# Patient Record
Sex: FEMALE | Race: WHITE
[De-identification: names, ages, dates, MRNs, and addresses within clinical notes are randomized per-mention and may not be internally consistent; named-entity substitution may affect disease eponyms.]

---

## 2017-09-14 PROBLEM — Z00.00 ENCOUNTER FOR PREVENTIVE HEALTH EXAMINATION: Status: ACTIVE | Noted: 2017-09-14

## 2017-09-19 ENCOUNTER — APPOINTMENT (OUTPATIENT)
Dept: BREAST CENTER | Facility: CLINIC | Age: 62
End: 2017-09-19
Payer: COMMERCIAL

## 2017-09-19 VITALS
WEIGHT: 135 LBS | SYSTOLIC BLOOD PRESSURE: 110 MMHG | DIASTOLIC BLOOD PRESSURE: 60 MMHG | BODY MASS INDEX: 21.69 KG/M2 | HEIGHT: 66 IN

## 2017-09-19 PROCEDURE — 99203 OFFICE O/P NEW LOW 30 MIN: CPT

## 2017-09-27 ENCOUNTER — OUTPATIENT (OUTPATIENT)
Dept: OUTPATIENT SERVICES | Facility: HOSPITAL | Age: 62
LOS: 1 days | Discharge: HOME | End: 2017-09-27

## 2017-09-27 DIAGNOSIS — Z02.9 ENCOUNTER FOR ADMINISTRATIVE EXAMINATIONS, UNSPECIFIED: ICD-10-CM

## 2017-09-29 ENCOUNTER — OUTPATIENT (OUTPATIENT)
Dept: OUTPATIENT SERVICES | Facility: HOSPITAL | Age: 62
LOS: 1 days | Discharge: HOME | End: 2017-09-29

## 2017-09-29 DIAGNOSIS — Z02.9 ENCOUNTER FOR ADMINISTRATIVE EXAMINATIONS, UNSPECIFIED: ICD-10-CM

## 2017-09-29 DIAGNOSIS — R89.7 ABNORMAL HISTOLOGICAL FINDINGS IN SPECIMENS FROM OTHER ORGANS, SYSTEMS AND TISSUES: ICD-10-CM

## 2017-10-04 ENCOUNTER — OUTPATIENT (OUTPATIENT)
Dept: OUTPATIENT SERVICES | Facility: HOSPITAL | Age: 62
LOS: 1 days | Discharge: HOME | End: 2017-10-04

## 2017-10-04 DIAGNOSIS — Z01.818 ENCOUNTER FOR OTHER PREPROCEDURAL EXAMINATION: ICD-10-CM

## 2017-10-18 ENCOUNTER — OUTPATIENT (OUTPATIENT)
Dept: OUTPATIENT SERVICES | Facility: HOSPITAL | Age: 62
LOS: 1 days | Discharge: HOME | End: 2017-10-18

## 2017-10-18 ENCOUNTER — APPOINTMENT (OUTPATIENT)
Dept: BREAST CENTER | Facility: HOSPITAL | Age: 62
End: 2017-10-18
Payer: COMMERCIAL

## 2017-10-18 PROCEDURE — 19301 PARTIAL MASTECTOMY: CPT | Mod: LT

## 2017-10-26 ENCOUNTER — APPOINTMENT (OUTPATIENT)
Dept: BREAST CENTER | Facility: CLINIC | Age: 62
End: 2017-10-26
Payer: COMMERCIAL

## 2017-10-26 VITALS
OXYGEN SATURATION: 97 % | HEART RATE: 75 BPM | SYSTOLIC BLOOD PRESSURE: 110 MMHG | HEIGHT: 66 IN | BODY MASS INDEX: 21.69 KG/M2 | DIASTOLIC BLOOD PRESSURE: 80 MMHG | WEIGHT: 135 LBS

## 2017-10-26 DIAGNOSIS — D24.2 BENIGN NEOPLASM OF LEFT BREAST: ICD-10-CM

## 2017-10-26 PROCEDURE — 99024 POSTOP FOLLOW-UP VISIT: CPT

## 2017-11-07 DIAGNOSIS — N60.82 OTHER BENIGN MAMMARY DYSPLASIAS OF LEFT BREAST: ICD-10-CM

## 2017-11-07 DIAGNOSIS — D24.2 BENIGN NEOPLASM OF LEFT BREAST: ICD-10-CM

## 2017-11-07 DIAGNOSIS — N60.32 FIBROSCLEROSIS OF LEFT BREAST: ICD-10-CM

## 2017-11-07 DIAGNOSIS — N63.0 UNSPECIFIED LUMP IN UNSPECIFIED BREAST: ICD-10-CM

## 2020-11-03 ENCOUNTER — OUTPATIENT (OUTPATIENT)
Dept: OUTPATIENT SERVICES | Facility: HOSPITAL | Age: 65
LOS: 1 days | Discharge: HOME | End: 2020-11-03

## 2020-11-03 DIAGNOSIS — Z11.59 ENCOUNTER FOR SCREENING FOR OTHER VIRAL DISEASES: ICD-10-CM

## 2020-11-05 ENCOUNTER — OUTPATIENT (OUTPATIENT)
Dept: OUTPATIENT SERVICES | Facility: HOSPITAL | Age: 65
LOS: 1 days | Discharge: HOME | End: 2020-11-05
Payer: MEDICARE

## 2020-11-05 DIAGNOSIS — F17.200 NICOTINE DEPENDENCE, UNSPECIFIED, UNCOMPLICATED: ICD-10-CM

## 2020-11-05 DIAGNOSIS — R10.13 EPIGASTRIC PAIN: ICD-10-CM

## 2020-11-05 PROCEDURE — 93016 CV STRESS TEST SUPVJ ONLY: CPT

## 2020-11-05 PROCEDURE — 93018 CV STRESS TEST I&R ONLY: CPT

## 2024-01-17 ENCOUNTER — APPOINTMENT (OUTPATIENT)
Dept: CARDIOTHORACIC SURGERY | Facility: CLINIC | Age: 69
End: 2024-01-17
Payer: MEDICARE

## 2024-01-17 VITALS
TEMPERATURE: 97.6 F | OXYGEN SATURATION: 96 % | BODY MASS INDEX: 24.43 KG/M2 | WEIGHT: 152 LBS | RESPIRATION RATE: 12 BRPM | HEIGHT: 66 IN | HEART RATE: 76 BPM | SYSTOLIC BLOOD PRESSURE: 122 MMHG | DIASTOLIC BLOOD PRESSURE: 77 MMHG

## 2024-01-17 DIAGNOSIS — I71.21 ANEURYSM OF THE ASCENDING AORTA, WITHOUT RUPTURE: ICD-10-CM

## 2024-01-17 PROCEDURE — 99203 OFFICE O/P NEW LOW 30 MIN: CPT

## 2024-01-17 NOTE — ASSESSMENT
[FreeTextEntry1] : The images were reviewed with the cardiothoracic surgeon. Currently following with the healthcare team. Given the absence of a history of sudden death, collagen vascular conditions, patient's threshold for an intervention for the ascending aortic aneurysm would be 5.5 cm or larger or rapid enlargement of the aneurysm (0.6 cm/6 months). Patient was educated on the presentation of the aneurysm, as well as signs and symptoms of rupture. Medical therapy for thoracic aneurysms was discussed with the patient (BP and HR control). Patient was instructed to maintain low salt diet, as well as maintain blood pressure parameters less than 120 mm hg systolic as well as a heart rate closer to 60 bpm.   Plan: #Aneurysm Will need a New CT Chest Aorta Protocol in 6 months Obtain Echo from PMD office Return to office in 6 months May need Beta Blocker initiation.   At present, the CT scan of Oct-2023 and the CT of Jan-2024 show little difference in size (4.4-4.6cm in mis ascending aorta, in non-contras, non-gated studies. -FMR

## 2024-01-17 NOTE — HISTORY OF PRESENT ILLNESS
[FreeTextEntry1] : CHRIST BEY is a 68 year F  being seen in our office for a consultation for an ascending aortic aneurysm. PMH includes Smoking, DLD. Patient had workup done by their primary healthcare team. During that workup it was uncovered that the patient has an Ascending aortic aneurysm without rupture.

## 2024-01-31 ENCOUNTER — OUTPATIENT (OUTPATIENT)
Dept: OUTPATIENT SERVICES | Facility: HOSPITAL | Age: 69
LOS: 1 days | End: 2024-01-31
Payer: MEDICARE

## 2024-01-31 DIAGNOSIS — Z00.8 ENCOUNTER FOR OTHER GENERAL EXAMINATION: ICD-10-CM

## 2024-01-31 DIAGNOSIS — R06.09 OTHER FORMS OF DYSPNEA: ICD-10-CM

## 2024-01-31 PROCEDURE — 78452 HT MUSCLE IMAGE SPECT MULT: CPT

## 2024-01-31 PROCEDURE — 93018 CV STRESS TEST I&R ONLY: CPT

## 2024-01-31 PROCEDURE — 78452 HT MUSCLE IMAGE SPECT MULT: CPT | Mod: 26

## 2024-01-31 PROCEDURE — A9500: CPT

## 2024-02-01 DIAGNOSIS — R06.09 OTHER FORMS OF DYSPNEA: ICD-10-CM

## 2024-05-23 ENCOUNTER — APPOINTMENT (OUTPATIENT)
Dept: UROGYNECOLOGY | Facility: CLINIC | Age: 69
End: 2024-05-23
Payer: MEDICARE

## 2024-05-23 VITALS
HEIGHT: 66 IN | DIASTOLIC BLOOD PRESSURE: 78 MMHG | SYSTOLIC BLOOD PRESSURE: 136 MMHG | HEART RATE: 81 BPM | WEIGHT: 160 LBS | BODY MASS INDEX: 25.71 KG/M2

## 2024-05-23 DIAGNOSIS — F17.200 NICOTINE DEPENDENCE, UNSPECIFIED, UNCOMPLICATED: ICD-10-CM

## 2024-05-23 DIAGNOSIS — M79.18 MYALGIA, OTHER SITE: ICD-10-CM

## 2024-05-23 DIAGNOSIS — Z83.3 FAMILY HISTORY OF DIABETES MELLITUS: ICD-10-CM

## 2024-05-23 DIAGNOSIS — Z87.891 PERSONAL HISTORY OF NICOTINE DEPENDENCE: ICD-10-CM

## 2024-05-23 DIAGNOSIS — Z87.39 PERSONAL HISTORY OF OTHER DISEASES OF THE MUSCULOSKELETAL SYSTEM AND CONNECTIVE TISSUE: ICD-10-CM

## 2024-05-23 DIAGNOSIS — Z78.9 OTHER SPECIFIED HEALTH STATUS: ICD-10-CM

## 2024-05-23 DIAGNOSIS — R35.0 FREQUENCY OF MICTURITION: ICD-10-CM

## 2024-05-23 DIAGNOSIS — J43.9 EMPHYSEMA, UNSPECIFIED: ICD-10-CM

## 2024-05-23 PROCEDURE — G2211 COMPLEX E/M VISIT ADD ON: CPT

## 2024-05-23 PROCEDURE — 99459 PELVIC EXAMINATION: CPT

## 2024-05-23 PROCEDURE — 99205 OFFICE O/P NEW HI 60 MIN: CPT | Mod: 25

## 2024-05-23 PROCEDURE — 51701 INSERT BLADDER CATHETER: CPT

## 2024-05-23 RX ORDER — CYCLOBENZAPRINE HYDROCHLORIDE 5 MG/1
5 TABLET, FILM COATED ORAL
Qty: 60 | Refills: 2 | Status: ACTIVE | COMMUNITY
Start: 2024-05-23 | End: 1900-01-01

## 2024-05-23 RX ORDER — ROSUVASTATIN CALCIUM 20 MG/1
20 TABLET, FILM COATED ORAL
Refills: 0 | Status: ACTIVE | COMMUNITY

## 2024-05-23 RX ORDER — OXYCODONE AND ACETAMINOPHEN 5; 325 MG/1; MG/1
5-325 TABLET ORAL
Qty: 15 | Refills: 0 | Status: COMPLETED | COMMUNITY
Start: 2017-10-18 | End: 2024-05-23

## 2024-05-23 NOTE — HISTORY OF PRESENT ILLNESS
[FreeTextEntry1] : Pt with pelvic floor dysfunction here for urogynecologic evaluation. She describes:   Chief PFD:  urinary frequency  Pelvic organ prolapse: s/p tubal, no bulge, denies splinting Stress urinary incontinence: denies Overactive bladder syndrome: voids at least every hour secondary to urgency, no incontinence, no eneuresis, for years, no prior treatment, no glaucoma Voiding dysfunction: no Incomplete bladder emptying, no hesitancy  Lower urinary tract/vaginal symptoms: no recurrent UTIs per year, no hematuria, no dysuria, no bladder pain  Fecal incontinence: denies Defecatory dysfunction: sausage Sexual dysfunction: pain at the opening and deeper for years Pelvic pain: denies Vaginal dryness denies  Her pelvic floor symptoms are significantly bothersome and negatively impacting her quality of life.

## 2024-05-23 NOTE — COUNSELING
[FreeTextEntry1] :  We will notify you of the urine results if they are abnormal.  Please start drinking at least 4 cups of plain water per day  Please cut down on the coffee (at most 3 cups of coffee per day)  Please start taking the flexeril (muscle relaxant) twice a day for the pain. It can make you sleepy  Please call my office if you have any issues with the cost or side effects of the medication.  Schedule a 6-week med check with either my PARaquel or my PAMadison (frequency/myalgia)

## 2024-05-23 NOTE — PHYSICAL EXAM
[Chaperone Present] : A chaperone was present in the examining room during all aspects of the physical examination [[id number]] : A chaperone was present during the pelvic exam. [FreeTextEntry2] : A.Z. [FreeTextEntry1] : Void:  300cc PVR:  130cc (normal PVR for this volume voided is 143cc or less) Urethra was prepped in sterile fashion and then a sterile non- indwelling catheter (14F) was used by me to drain the bladder. The patient tolerated the procedure well. Indication: Urinary Frequency    Well healed incision: Umbilical normal perineal sensation normal perineal reflexes -cough stress test +atrophy -prolapse +urethral hypermobility +bilateral levator ani spasm,  +tenderness -urethral tenderness -bladder tenderness -cervical tenderness 1/5 Kegel

## 2024-05-23 NOTE — DISCUSSION/SUMMARY
[FreeTextEntry1] :  Urinary frequency- The pathophysiology of the above condition was discussed with the patient. The patient was given information and education on pelvic floor muscle exercises/rehabilitation, avoidance of dietary bladder irritants, and other strategies to improve bladder control, such as scheduled voiding. She was counseled regarding further management strategies for overactive bladder and urge incontinence including pelvic floor physical therapy, medications or surgical management. The patient voiced understanding and agrees with diet changes. We will follow up on her urine tests.  Myalgia- Discussed the pathophysiology of the above condition. Reviewed management options including medications (oral or vaginal suppository), injections, pelvic floor physical therapy, or referral for possible pudendal nerve blocks. The patient agrees to medical management. The risks and benefits of flexeril was reviewed.

## 2024-05-23 NOTE — REASON FOR VISIT
[TextEntry] : Reason for visit: New Patient Voids per day: 15   Voids per night: 3   Urge incontinence: No Stress incontinence: No Constipation: No Fecal incontinence: No Vaginal bulge: No

## 2024-05-23 NOTE — END OF VISIT
[TextEntry] : 60m E&M, >50% spent in direct face to face counseling/coordination of care urinary frequency, myalgia. This excludes cath.  All questions answered. Patient reassured.

## 2024-05-27 LAB — URINE CULTURE <10: NORMAL

## 2024-07-05 ENCOUNTER — APPOINTMENT (OUTPATIENT)
Dept: UROGYNECOLOGY | Facility: CLINIC | Age: 69
End: 2024-07-05
Payer: MEDICARE

## 2024-07-05 VITALS
SYSTOLIC BLOOD PRESSURE: 108 MMHG | BODY MASS INDEX: 25.23 KG/M2 | DIASTOLIC BLOOD PRESSURE: 71 MMHG | HEART RATE: 93 BPM | WEIGHT: 157 LBS | HEIGHT: 66 IN

## 2024-07-05 DIAGNOSIS — M79.18 MYALGIA, OTHER SITE: ICD-10-CM

## 2024-07-05 DIAGNOSIS — R35.0 FREQUENCY OF MICTURITION: ICD-10-CM

## 2024-07-05 PROCEDURE — 99214 OFFICE O/P EST MOD 30 MIN: CPT

## 2024-08-03 ENCOUNTER — RESULT REVIEW (OUTPATIENT)
Age: 69
End: 2024-08-03

## 2024-08-03 ENCOUNTER — OUTPATIENT (OUTPATIENT)
Dept: OUTPATIENT SERVICES | Facility: HOSPITAL | Age: 69
LOS: 1 days | End: 2024-08-03
Payer: MEDICARE

## 2024-08-03 DIAGNOSIS — Z00.8 ENCOUNTER FOR OTHER GENERAL EXAMINATION: ICD-10-CM

## 2024-08-03 DIAGNOSIS — I71.21 ANEURYSM OF THE ASCENDING AORTA, WITHOUT RUPTURE: ICD-10-CM

## 2024-08-03 PROCEDURE — 71275 CT ANGIOGRAPHY CHEST: CPT

## 2024-08-03 PROCEDURE — 71275 CT ANGIOGRAPHY CHEST: CPT | Mod: 26

## 2024-08-04 DIAGNOSIS — I71.21 ANEURYSM OF THE ASCENDING AORTA, WITHOUT RUPTURE: ICD-10-CM

## 2024-08-07 ENCOUNTER — APPOINTMENT (OUTPATIENT)
Dept: CARDIOTHORACIC SURGERY | Facility: CLINIC | Age: 69
End: 2024-08-07

## 2024-08-07 PROCEDURE — 99442: CPT

## 2024-08-09 NOTE — ASSESSMENT
[FreeTextEntry1] : CHRIST BEY is a 68 year F  being seen in our office for a follow up for an ascending aortic aneurysm. Patient was educated on the presentation of the aneurysm, as well as signs and symptoms of rupture. They were instructed to go to the ER for any chest pain, and back pain. Patient was instructed to maintain low salt diet, as well as maintain blood pressure parameters less than 130 mm hg systolic as well as a heart rate less than 100 bpm.    Plan: 11 Minutes of Televisit Rendered CTA reviewed with patient by phone Ascending Aortic Aneurysm - stable 4.4 CM Plan for CT Chest non contrast 1 year with echocardiogram F/U after for review Stress test from January also reviewed, January had Calcium Score 661- seeing Dr Ocasio in October to establish care Continue current medications PMD for routine medical care

## 2024-08-09 NOTE — HISTORY OF PRESENT ILLNESS
[Home] : at home, [unfilled] , at the time of the visit. [Medical Office: ([facility])___] : at the medical office located in  [Verbal consent obtained from patient] : the patient, [unfilled] [FreeTextEntry1] : CHRIST BEY is a 68 year F  being seen in our office for a follow up for an ascending aortic aneurysm. PMH includes Smoking, DLD. Patient had workup done by their primary healthcare team. During that workup it was uncovered that the patient has an Ascending aortic aneurysm without rupture at 4.4 CM.  She had a CTA which showed stability.  Televisit for review  ECOG 0 Fully Independent  Their Healthcare team is as follows: PMD: Dr. Cotter Cardiologist: Dr. Ocasio  .

## 2024-08-09 NOTE — HISTORY OF PRESENT ILLNESS
[Home] : at home, [unfilled] , at the time of the visit. [Medical Office: ([facility])___] : at the medical office located in  [Verbal consent obtained from patient] : the patient, [unfilled] [FreeTextEntry1] : CHRIST BEY is a 68 year F  being seen in our office for a follow up for an ascending aortic aneurysm. PMH includes Smoking, DLD. Patient had workup done by their primary healthcare team. During that workup it was uncovered that the patient has an Ascending aortic aneurysm without rupture at 4.4 CM.  She had a CTA which showed stability.  Televisit for review  ECOG 0 Fully Independent  Their Healthcare team is as follows: PMD: Dr. Cotter Cardiologist: Dr. Ocasio  .

## 2025-01-03 ENCOUNTER — APPOINTMENT (OUTPATIENT)
Dept: UROGYNECOLOGY | Facility: CLINIC | Age: 70
End: 2025-01-03

## 2025-07-11 ENCOUNTER — RESULT REVIEW (OUTPATIENT)
Age: 70
End: 2025-07-11

## 2025-07-11 ENCOUNTER — OUTPATIENT (OUTPATIENT)
Dept: OUTPATIENT SERVICES | Facility: HOSPITAL | Age: 70
LOS: 1 days | End: 2025-07-11
Payer: MEDICARE

## 2025-07-11 DIAGNOSIS — Z00.8 ENCOUNTER FOR OTHER GENERAL EXAMINATION: ICD-10-CM

## 2025-07-11 DIAGNOSIS — I71.21 ANEURYSM OF THE ASCENDING AORTA, WITHOUT RUPTURE: ICD-10-CM

## 2025-07-11 PROCEDURE — 71250 CT THORAX DX C-: CPT

## 2025-07-11 PROCEDURE — 71250 CT THORAX DX C-: CPT | Mod: 26

## 2025-07-12 DIAGNOSIS — I71.21 ANEURYSM OF THE ASCENDING AORTA, WITHOUT RUPTURE: ICD-10-CM

## 2025-07-28 ENCOUNTER — OUTPATIENT (OUTPATIENT)
Dept: OUTPATIENT SERVICES | Facility: HOSPITAL | Age: 70
LOS: 1 days | End: 2025-07-28
Payer: MEDICARE

## 2025-07-28 ENCOUNTER — APPOINTMENT (OUTPATIENT)
Dept: CARDIOTHORACIC SURGERY | Facility: CLINIC | Age: 70
End: 2025-07-28
Payer: MEDICARE

## 2025-07-28 ENCOUNTER — APPOINTMENT (OUTPATIENT)
Dept: CV DIAGNOSITCS | Facility: HOSPITAL | Age: 70
End: 2025-07-28
Payer: MEDICARE

## 2025-07-28 ENCOUNTER — RESULT REVIEW (OUTPATIENT)
Age: 70
End: 2025-07-28

## 2025-07-28 VITALS
OXYGEN SATURATION: 96 % | TEMPERATURE: 98 F | DIASTOLIC BLOOD PRESSURE: 84 MMHG | RESPIRATION RATE: 16 BRPM | BODY MASS INDEX: 25.71 KG/M2 | WEIGHT: 160 LBS | HEIGHT: 66 IN | HEART RATE: 75 BPM | SYSTOLIC BLOOD PRESSURE: 138 MMHG

## 2025-07-28 DIAGNOSIS — I71.21 ANEURYSM OF THE ASCENDING AORTA, WITHOUT RUPTURE: ICD-10-CM

## 2025-07-28 PROCEDURE — 93306 TTE W/DOPPLER COMPLETE: CPT | Mod: 26

## 2025-07-28 PROCEDURE — 99214 OFFICE O/P EST MOD 30 MIN: CPT

## 2025-07-28 PROCEDURE — 93306 TTE W/DOPPLER COMPLETE: CPT

## 2025-07-29 DIAGNOSIS — I71.21 ANEURYSM OF THE ASCENDING AORTA, WITHOUT RUPTURE: ICD-10-CM
